# Patient Record
Sex: MALE | Race: WHITE | NOT HISPANIC OR LATINO | Employment: OTHER | URBAN - METROPOLITAN AREA
[De-identification: names, ages, dates, MRNs, and addresses within clinical notes are randomized per-mention and may not be internally consistent; named-entity substitution may affect disease eponyms.]

---

## 2019-07-02 ENCOUNTER — OFFICE VISIT (OUTPATIENT)
Dept: URGENT CARE | Facility: PHYSICIAN GROUP | Age: 59
End: 2019-07-02

## 2019-07-02 VITALS
TEMPERATURE: 98.6 F | BODY MASS INDEX: 28.89 KG/M2 | HEART RATE: 74 BPM | DIASTOLIC BLOOD PRESSURE: 70 MMHG | WEIGHT: 218 LBS | RESPIRATION RATE: 16 BRPM | HEIGHT: 73 IN | SYSTOLIC BLOOD PRESSURE: 128 MMHG | OXYGEN SATURATION: 94 %

## 2019-07-02 DIAGNOSIS — B02.9 HERPES ZOSTER WITHOUT COMPLICATION: ICD-10-CM

## 2019-07-02 PROCEDURE — 99203 OFFICE O/P NEW LOW 30 MIN: CPT | Performed by: PHYSICIAN ASSISTANT

## 2019-07-02 RX ORDER — VALACYCLOVIR HYDROCHLORIDE 1 G/1
1000 TABLET, FILM COATED ORAL 3 TIMES DAILY
Qty: 21 TAB | Refills: 0 | Status: SHIPPED | OUTPATIENT
Start: 2019-07-02 | End: 2019-07-09

## 2019-07-02 ASSESSMENT — ENCOUNTER SYMPTOMS
MUSCULOSKELETAL NEGATIVE: 1
GASTROINTESTINAL NEGATIVE: 1
RESPIRATORY NEGATIVE: 1
CONSTITUTIONAL NEGATIVE: 1
CARDIOVASCULAR NEGATIVE: 1

## 2019-07-02 NOTE — PROGRESS NOTES
"Subjective:      Stephan Chamorro is a 59 y.o. male who presents with Rash (on Lt side, painful  X 5)        Rash     Patient presents for 5 days of worsening left sided trunk and abdomen rash.The rash is not itchy but is painful to touch.  No relieviating factors. Aggravating factors  He tried OTC anti-itch cream but did not help.  He rates his pain is about 3/10   Denies any associated symptoms such fevers, chills, myalgias, N/V/D.        Review of Systems   Constitutional: Negative.    HENT: Negative.    Respiratory: Negative.    Cardiovascular: Negative.    Gastrointestinal: Negative.    Musculoskeletal: Negative.    Skin: Positive for rash. Negative for itching.   Endo/Heme/Allergies: Negative.    All other systems reviewed and are negative.       PMH:  has no past medical history on file.  MEDS:   Current Outpatient Prescriptions:   •  clotrimazole (LOTRIMIN AF) 1 % CREA, Apply  to affected area(s) 2 times a day., Disp: , Rfl:   •  ibuprofen (MOTRIN) 200 MG TABS, Take 200 mg by mouth every 6 hours as needed., Disp: , Rfl:   ALLERGIES: No Known Allergies  SURGHX:   Past Surgical History:   Procedure Laterality Date   • APPENDECTOMY       SOCHX:  reports that he has been smoking Cigarettes.  He has a 15.00 pack-year smoking history. He has never used smokeless tobacco. He reports that he drinks alcohol. He reports that he does not use drugs.  FH: Family history was reviewed, no pertinent findings to report   Objective:     /70   Pulse 74   Temp 37 °C (98.6 °F)   Resp 16   Ht 1.854 m (6' 1\")   Wt 98.9 kg (218 lb)   SpO2 94%   BMI 28.76 kg/m²      Physical Exam   Constitutional: He is oriented to person, place, and time. He appears well-developed and well-nourished. No distress.   HENT:   Head: Normocephalic and atraumatic.   Right Ear: External ear normal.   Left Ear: External ear normal.   Nose: Nose normal.   Eyes: Pupils are equal, round, and reactive to light. Conjunctivae and EOM are normal.   "   Neck: Normal range of motion. Neck supple.   Cardiovascular: Normal rate, regular rhythm and normal heart sounds.    Pulmonary/Chest: Effort normal and breath sounds normal. No respiratory distress.   Musculoskeletal: Normal range of motion. He exhibits no edema.   Neurological: He is alert and oriented to person, place, and time.   Skin: Skin is warm and dry. He is not diaphoretic.        Psychiatric: He has a normal mood and affect. His behavior is normal.   Vitals reviewed.              Assessment/Plan:     1. Herpes zoster without complication  valacyclovir (VALTREX) 1 GM Tab       -consistent with zoster as above.   -detailed discussion and education of this dx with patient.   -valtrex rx.   -PCP follow up encouraged and recommended ot this.       Supportive care, differential diagnoses, and indications for immediate follow-up discussed with patient.   Pathogenesis of diagnosis discussed including typical length and natural progression.   Instructed to return to clinic or nearest emergency department for any change in condition, further concerns, or worsening of symptoms.  Patient states understanding of the plan of care and discharge instructions.      Catrina Ventura P.A.-C.

## 2023-01-01 ENCOUNTER — HOSPITAL ENCOUNTER (OUTPATIENT)
Dept: RADIOLOGY | Facility: MEDICAL CENTER | Age: 63
End: 2023-10-05
Attending: EMERGENCY MEDICINE
Payer: COMMERCIAL

## 2023-01-01 ENCOUNTER — HOSPITAL ENCOUNTER (OUTPATIENT)
Dept: RADIOLOGY | Facility: MEDICAL CENTER | Age: 63
End: 2023-10-05
Attending: FAMILY MEDICINE
Payer: COMMERCIAL

## 2023-01-01 ENCOUNTER — PATIENT OUTREACH (OUTPATIENT)
Dept: ONCOLOGY | Facility: MEDICAL CENTER | Age: 63
End: 2023-01-01
Payer: COMMERCIAL

## 2023-01-01 ENCOUNTER — HOSPITAL ENCOUNTER (OUTPATIENT)
Dept: RADIATION ONCOLOGY | Facility: MEDICAL CENTER | Age: 63
End: 2023-10-17

## 2023-01-01 ENCOUNTER — HOSPITAL ENCOUNTER (OUTPATIENT)
Dept: RADIATION ONCOLOGY | Facility: MEDICAL CENTER | Age: 63
End: 2023-10-31
Attending: RADIOLOGY
Payer: COMMERCIAL

## 2023-01-01 ENCOUNTER — HOSPITAL ENCOUNTER (OUTPATIENT)
Dept: RADIATION ONCOLOGY | Facility: MEDICAL CENTER | Age: 63
End: 2023-10-12
Payer: COMMERCIAL

## 2023-01-01 ENCOUNTER — HOSPITAL ENCOUNTER (OUTPATIENT)
Dept: RADIATION ONCOLOGY | Facility: MEDICAL CENTER | Age: 63
End: 2023-10-06

## 2023-01-01 ENCOUNTER — HOSPITAL ENCOUNTER (OUTPATIENT)
Dept: RADIATION ONCOLOGY | Facility: MEDICAL CENTER | Age: 63
End: 2023-10-13
Payer: COMMERCIAL

## 2023-01-01 ENCOUNTER — HOSPITAL ENCOUNTER (OUTPATIENT)
Dept: RADIATION ONCOLOGY | Facility: MEDICAL CENTER | Age: 63
End: 2023-10-18

## 2023-01-01 ENCOUNTER — HOSPITAL ENCOUNTER (OUTPATIENT)
Dept: RADIOLOGY | Facility: MEDICAL CENTER | Age: 63
End: 2023-10-05
Attending: NURSE PRACTITIONER
Payer: COMMERCIAL

## 2023-01-01 ENCOUNTER — HOSPITAL ENCOUNTER (OUTPATIENT)
Dept: RADIOLOGY | Facility: MEDICAL CENTER | Age: 63
End: 2023-10-05
Attending: STUDENT IN AN ORGANIZED HEALTH CARE EDUCATION/TRAINING PROGRAM
Payer: COMMERCIAL

## 2023-01-01 ENCOUNTER — HOSPITAL ENCOUNTER (OUTPATIENT)
Dept: RADIOLOGY | Facility: MEDICAL CENTER | Age: 63
End: 2023-06-29
Attending: NURSE PRACTITIONER
Payer: COMMERCIAL

## 2023-01-01 ENCOUNTER — HOSPITAL ENCOUNTER (OUTPATIENT)
Dept: RADIATION ONCOLOGY | Facility: MEDICAL CENTER | Age: 63
End: 2023-10-16
Payer: COMMERCIAL

## 2023-01-01 ENCOUNTER — TELEPHONE (OUTPATIENT)
Dept: HEALTH INFORMATION MANAGEMENT | Facility: OTHER | Age: 63
End: 2023-01-01

## 2023-01-01 ENCOUNTER — HOSPITAL ENCOUNTER (OUTPATIENT)
Dept: RADIATION ONCOLOGY | Facility: MEDICAL CENTER | Age: 63
End: 2023-11-30
Attending: RADIOLOGY
Payer: COMMERCIAL

## 2023-01-01 VITALS
TEMPERATURE: 96.2 F | SYSTOLIC BLOOD PRESSURE: 103 MMHG | HEIGHT: 73 IN | BODY MASS INDEX: 25.22 KG/M2 | DIASTOLIC BLOOD PRESSURE: 73 MMHG | OXYGEN SATURATION: 95 % | HEART RATE: 77 BPM | WEIGHT: 190.26 LBS

## 2023-01-01 DIAGNOSIS — C61 MALIGNANT NEOPLASM OF PROSTATE (HCC): ICD-10-CM

## 2023-01-01 LAB
CHEMOTHERAPY INFUSION START DATE: NORMAL
CHEMOTHERAPY INFUSION STOP DATE: NORMAL
CHEMOTHERAPY RECORDS: 2500
CHEMOTHERAPY RECORDS: 5
CHEMOTHERAPY RECORDS: NORMAL
CHEMOTHERAPY RX CANCER: NORMAL
DATE 1ST CHEMO CANCER: NORMAL
RAD ONC ARIA COURSE LAST TREATMENT DATE: NORMAL
RAD ONC ARIA COURSE TREATMENT ELAPSED DAYS: NORMAL
RAD ONC ARIA REFERENCE POINT DOSAGE GIVEN TO DATE: 10
RAD ONC ARIA REFERENCE POINT DOSAGE GIVEN TO DATE: 15
RAD ONC ARIA REFERENCE POINT DOSAGE GIVEN TO DATE: 20
RAD ONC ARIA REFERENCE POINT DOSAGE GIVEN TO DATE: 25
RAD ONC ARIA REFERENCE POINT DOSAGE GIVEN TO DATE: 25
RAD ONC ARIA REFERENCE POINT DOSAGE GIVEN TO DATE: 5
RAD ONC ARIA REFERENCE POINT ID: NORMAL
RAD ONC ARIA REFERENCE POINT SESSION DOSAGE GIVEN: 5

## 2023-01-01 PROCEDURE — 77373 STRTCTC BDY RAD THER TX DLVR: CPT | Performed by: RADIOLOGY

## 2023-01-01 PROCEDURE — 77290 THER RAD SIMULAJ FIELD CPLX: CPT | Performed by: RADIOLOGY

## 2023-01-01 PROCEDURE — 77435 SBRT MANAGEMENT: CPT | Performed by: RADIOLOGY

## 2023-01-01 PROCEDURE — 77280 THER RAD SIMULAJ FIELD SMPL: CPT | Mod: 26 | Performed by: RADIOLOGY

## 2023-01-01 PROCEDURE — 77295 3-D RADIOTHERAPY PLAN: CPT | Mod: 26 | Performed by: RADIOLOGY

## 2023-01-01 PROCEDURE — 77470 SPECIAL RADIATION TREATMENT: CPT | Mod: 26 | Performed by: RADIOLOGY

## 2023-01-01 PROCEDURE — 77280 THER RAD SIMULAJ FIELD SMPL: CPT | Performed by: RADIOLOGY

## 2023-01-01 PROCEDURE — 77334 RADIATION TREATMENT AID(S): CPT | Mod: 26 | Performed by: RADIOLOGY

## 2023-01-01 PROCEDURE — 77300 RADIATION THERAPY DOSE PLAN: CPT | Mod: 26 | Performed by: RADIOLOGY

## 2023-01-01 PROCEDURE — 77334 RADIATION TREATMENT AID(S): CPT | Performed by: RADIOLOGY

## 2023-01-01 PROCEDURE — 99205 OFFICE O/P NEW HI 60 MIN: CPT | Mod: 25 | Performed by: RADIOLOGY

## 2023-01-01 PROCEDURE — 77263 THER RADIOLOGY TX PLNG CPLX: CPT | Performed by: RADIOLOGY

## 2023-01-01 PROCEDURE — 77300 RADIATION THERAPY DOSE PLAN: CPT | Performed by: RADIOLOGY

## 2023-01-01 PROCEDURE — 77470 SPECIAL RADIATION TREATMENT: CPT | Performed by: RADIOLOGY

## 2023-01-01 PROCEDURE — 77336 RADIATION PHYSICS CONSULT: CPT | Mod: XU | Performed by: RADIOLOGY

## 2023-01-01 PROCEDURE — 77290 THER RAD SIMULAJ FIELD CPLX: CPT | Mod: 26 | Performed by: RADIOLOGY

## 2023-01-01 PROCEDURE — A9595 CT-PETCT-PROSTATE SCAN SKULL BASE TO MID-THIGH (PYLARIFY OR AXUMIN): HCPCS

## 2023-01-01 PROCEDURE — 77370 RADIATION PHYSICS CONSULT: CPT | Performed by: RADIOLOGY

## 2023-01-01 PROCEDURE — 77295 3-D RADIOTHERAPY PLAN: CPT | Performed by: RADIOLOGY

## 2023-01-01 PROCEDURE — 99214 OFFICE O/P EST MOD 30 MIN: CPT | Mod: 25 | Performed by: RADIOLOGY

## 2023-01-01 RX ORDER — METHOCARBAMOL 500 MG/1
500 TABLET, FILM COATED ORAL
COMMUNITY

## 2023-01-01 RX ORDER — MULTIVIT WITH MINERALS/LUTEIN
1000 TABLET ORAL
COMMUNITY

## 2023-01-01 RX ORDER — NAPROXEN SODIUM 220 MG
220 TABLET ORAL DAILY
COMMUNITY

## 2023-01-01 RX ORDER — NAPROXEN 500 MG/1
TABLET ORAL
COMMUNITY
Start: 2023-01-01 | End: 2023-01-01

## 2023-01-01 RX ORDER — DEXAMETHASONE 4 MG/1
8 TABLET ORAL
COMMUNITY
Start: 2023-01-01

## 2023-01-01 RX ORDER — FLUDROCORTISONE ACETATE 0.1 MG/1
TABLET ORAL
COMMUNITY
Start: 2023-01-01 | End: 2024-08-08

## 2023-01-01 ASSESSMENT — PAIN SCALES - GENERAL: PAINLEVEL: 2=MINIMAL-SLIGHT

## 2023-10-06 PROBLEM — C61 MALIGNANT NEOPLASM OF PROSTATE (HCC): Status: ACTIVE | Noted: 2023-01-01

## 2023-10-06 NOTE — CONSULTS
RADIATION ONCOLOGY CONSULT    DATE OF SERVICE: 10/6/2023    IDENTIFICATION: A 63 y.o. male with   Visit Diagnoses     ICD-10-CM   1. Malignant neoplasm of prostate (HCC)  C61     Malignant neoplasm of prostate (HCC)  Staging form: Prostate, AJCC 8th Edition  - Clinical stage from 10/6/2023: Stage IVB (cTX, cNX, pM1b, PSA: 50.3, Grade Group: 5) - Signed by Ervin Gant M.D. on 10/6/2023  Prostate specific antigen (PSA) range: 20 or greater  Histologic grading system: 5 grade system    He is here at the kind request of Dr. Ashley Small    HISTORY OF PRESENT ILLNESS:   Patient originally presented in early 2022 with [place] 5+4 prostate cancer by the metastatic bone on presentation.  PSA 50.3 10/7/2021 at diagnosis. He was started on dual deprivation therapy with good response to PSA but still persistent pain.  Patient was trialed on abiraterone since 2021. PSA as low as 3.09 5/16/22 and the slowly ishmael to 15.87 10/24/22 and then enzalutamide 11/2022 with Dr. Veronica Palacio [facility]. Underwent palliative radiation to bilateral femurs 30 Leiva in 10 fractions completed February 4, 2022.  Patient completed lumbar spine radiation 30 Leiva in 10 fractions February 4, 2022.  Patient underwent 30 Gray in 10 fractions to right shoulder April 6, 2022.  Patient underwent left shoulder radiation 8 Leiva in 1 fraction November 8, 2022.  He was switched to docetaxel February 15, 2023.  Underwent 30 Leiva in 10 fractions to T9-T12 completed April 14, 2023. SBRT to L3/L4 completed 4/2023.   PSMA PET scan June 29, 2023 which shows extensive osseous metastatic disease largest lesions in left femur right clavicle T5, L1, right sacrum and bilateral proximal femurs. Patient was recently admitted had left eye proptosis with disconjugate gaze underwent MRI brain which showed infiltrative mass in the clivus extending to the dorsum sella and left cavernous sinus with enhancing soft tissue nodule of the left  cerebellopontine angle.  Mass abuts the left 7th cranial nerve and is in close proximity to the 5th and 8th CN.  Has difficulty with abduction of the eye and has significant ptosis.  Also has double vision.    PAST MEDICAL HISTORY:  Past Medical History:   Diagnosis Date    History of ptosis of eyelid     Left eye    Prostate cancer (HCC) 2021       PAST SURGICAL HISTORY:  Past Surgical History:   Procedure Laterality Date    PROSTATE NEEDLE BIOPSY Right 10/2021    APPENDECTOMY         CURRENT MEDICATIONS:  Current Outpatient Medications   Medication Sig Dispense Refill    Cholecalciferol (VITAMIN D) 400 UNIT Tab       cyanocobalamin (VITAMIN B12) 500 MCG tablet Take 500 mcg by mouth every day.      dexamethasone (DECADRON) 4 MG Tab 8 mg.      fludrocortisone (FLORINEF) 0.1 MG Tab TAKE ONE-HALF TABLET BY MOUTH EVERY MORNING FOR ADRENAL INSUFFICIENCY      methocarbamol (ROBAXIN) 500 MG Tab Take 500 mg by mouth.      naproxen (ANAPROX) 220 MG tablet Take 220 mg by mouth every day.      naproxen (NAPROSYN) 500 MG Tab TAKE ONE TABLET BY MOUTH TWICE A DAY AS NEEDED FOR PAIN AND INFLAMMATION WITH A MEAL **MEDICATION GUIDE**      Ascorbic Acid (VITAMIN C) 1000 MG Tab Take 1,000 mg by mouth.      clotrimazole (LOTRIMIN AF) 1 % CREA Apply  to affected area(s) 2 times a day.      ibuprofen (MOTRIN) 200 MG TABS Take 200 mg by mouth every 6 hours as needed.       No current facility-administered medications for this encounter.       ALLERGIES:    Patient has no known allergies.    FAMILY HISTORY:    No fam hx of cancer    SOCIAL HISTORY:     reports that he has been smoking cigarettes. He started smoking about 32 years ago. He has a 15.0 pack-year smoking history. He has never used smokeless tobacco. He reports that he does not currently use alcohol. He reports that he does not use drugs. He states he lives in [place] with his spouse, Ciera. He is retired from the [employer].     REVIEW OF SYSTEMS:  A review of systems for today's date  "of service was reviewed and uploaded into the electronic medical record.      PHYSICAL EXAM:    ECOG PERFORMANCE STATUS:      10/6/2023     8:18 AM   ECOG Performance Review   ECOG Performance Status Restricted in physically strenuous activity but ambulatory and able to carry out work of a light or sedentary nature, e.g., light house work, office work         10/6/2023     8:18 AM   Karnofsky Score   Karnofsky Score 80     /73 (BP Location: Right arm, Patient Position: Sitting, BP Cuff Size: Adult)   Pulse 77   Temp (!) 35.7 °C (96.2 °F)   Ht 1.854 m (6' 1\")   Wt 86.3 kg (190 lb 4.1 oz)   SpO2 95%   BMI 25.10 kg/m²   Physical Exam  Vitals reviewed.   Eyes:      Pupils: Pupils are equal, round, and reactive to light.   Cardiovascular:      Rate and Rhythm: Normal rate.   Pulmonary:      Effort: Pulmonary effort is normal.   Abdominal:      General: Abdomen is flat.   Neurological:      Mental Status: He is alert.      Cranial Nerves: Cranial nerve deficit present.   Psychiatric:         Mood and Affect: Mood normal.          LABORATORY DATA:   No results found for: \"WBC\", \"HEMOGLOBIN\", \"HEMATOCRIT\", \"MCV\", \"PLATELETCT\", \"NEUTS\"   No results found for: \"SODIUM\", \"POTASSIUM\", \"BUN\", \"CREATININE\", \"CALCIUM\", \"ALBUMIN\", \"ASTSGOT\", \"ALKPHOSPHAT\", \"IFNOTAFR\", \"LDHTOTAL\"    RADIOLOGY DATA:  No results found.    IMPRESSION:    A 63 y.o. with  Visit Diagnoses     ICD-10-CM   1. Malignant neoplasm of prostate (HCC)  C61     Malignant neoplasm of prostate (HCC)  Staging form: Prostate, AJCC 8th Edition  - Clinical stage from 10/6/2023: Stage IVB (cTX, cNX, pM1b, PSA: 50.3, Grade Group: 5) - Signed by Ervin Gant M.D. on 10/6/2023  Prostate specific antigen (PSA) range: 20 or greater  Histologic grading system: 5 grade system        RECOMMENDATIONS:   I discussed case with Dr. 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  I recommend urgent stereotactic radiotherapy to the skull base 25 Leiva in 5 fractions to involve the left cavernous sinus to help stop progression of disease in that area and prevent progression onto the right eye.  I discussed with him that as its been over 10 days since diplopia started this may be permanent but goal of treatment will be to stop him from progressing and stop from involving the right eye.  Patient is amenable to treatment and will undergo radiation mapping today with treatment start next week.  We discussed risk and benefits patient is amenable to treatment.  He is getting a repeat PSA next week but if he is progressing on docetaxel likely will need systemic treatment change and I mentioned that he should follow-up with his medical oncologist Dr. Veronica Palacio and he may be a candidate for [id number] PSMA.    Thank you for the opportunity to participate in his care.  If any questions or comments, please do not hesitate in calling.    Orders Placed This Encounter    Rad Onc Treatment Planning CT Simulation    Referral to Oncology Psychosocial Screening for Distress    Cholecalciferol (VITAMIN D) 400 UNIT Tab    cyanocobalamin (VITAMIN B12) 500 MCG tablet    dexamethasone (DECADRON) 4 MG Tab    fludrocortisone (FLORINEF) 0.1 MG Tab    methocarbamol (ROBAXIN) 500 MG Tab    naproxen (ANAPROX) 220 MG tablet    naproxen (NAPROSYN) 500 MG Tab    Ascorbic Acid (VITAMIN C) 1000 MG Tab       CC: Dr. Veronica Palacio, Ashley Small

## 2023-10-06 NOTE — RADIATION COMPLETION NOTES
PATIENT NAME Stephan Chamorro   PRIMARY PHYSICIAN Pcp Pt States None [phone]   REFERRING PHYSICIAN No ref. provider found 1960     DATE OF SERVICE: 10/6/2023    DIAGNOSIS:  Malignant neoplasm of prostate (HCC)  Staging form: Prostate, AJCC 8th Edition  - Clinical stage from 10/6/2023: Stage IVB (cTX, cNX, pM1b, PSA: 50.3, Grade Group: 5) - Signed by Ervin Gant M.D. on 10/6/2023  Prostate specific antigen (PSA) range: 20 or greater  Histologic grading system: 5 grade system         SPECIAL TREATMENT PROCEDURE NOTE:  Considerable additional effort required in the management of this case because of administration of Stereotactic Radiotherapy, which may result in increased normal tissue toxicity and require greater effort in contouring and treatment because of greater precision.

## 2023-10-06 NOTE — RADIATION COMPLETION NOTES
DATE OF SERVICE: 10/6/2023    DIAGNOSIS:  Malignant neoplasm of prostate (HCC)  Staging form: Prostate, AJCC 8th Edition  - Clinical stage from 10/6/2023: Stage IVB (cTX, cNX, pM1b, PSA: 50.3, Grade Group: 5) - Signed by Ervin Gant M.D. on 10/6/2023  Prostate specific antigen (PSA) range: 20 or greater  Histologic grading system: 5 grade system       DATE OF SERVICE: 10/6/2023    TYPE OF SIMULATION: Brain    GOAL OF TREATMENT:   [] Curative  [x] Palliative  [] Oligometastatic    CONTRAST:    [] IV Contrast*                POSITION:    [x]  Supine  [] Prone     COMPLEX:  [] Complex Blocking   [x]Arcs  [] Custom Blocks  [] >3 Sites    PROCEDURE: Patient placed Stereotactic Mask with head in neutral position. CT scan obtained at 1 mm intervals. Images viewed and approved.  Images reconstructed as need.  Image data sets transferred to Brain-Lab for planning.    I have personally reviewed the relevant data, performed the target localization, and determined all relevant factors for this patient’s simulation.    *Omnipaque 80 -100cc IVP in conjunction with 500cc NS

## 2023-10-06 NOTE — PROGRESS NOTES
"Patient was seen today in clinic with Dr. Gant for consult.  Vitals signs and weight were obtained and pain assessment was completed.  Allergies and medications were reviewed with the patient.       Vitals/Pain:  Vitals:    10/06/23 0808   BP: 103/73   BP Location: Right arm   Patient Position: Sitting   BP Cuff Size: Adult   Pulse: 77   Temp: (!) 35.7 °C (96.2 °F)   SpO2: 95%   Weight: 86.3 kg (190 lb 4.1 oz)   Height: 1.854 m (6' 1\")   Pain Score: 2=Minimal-Slight        Allergies:   Patient has no known allergies.    Current Medications:  Current Outpatient Medications   Medication Sig Dispense Refill    Cholecalciferol (VITAMIN D) 400 UNIT Tab       cyanocobalamin (VITAMIN B12) 500 MCG tablet Take 500 mcg by mouth every day.      dexamethasone (DECADRON) 4 MG Tab 8 mg.      fludrocortisone (FLORINEF) 0.1 MG Tab TAKE ONE-HALF TABLET BY MOUTH EVERY MORNING FOR ADRENAL INSUFFICIENCY      methocarbamol (ROBAXIN) 500 MG Tab Take 500 mg by mouth.      naproxen (ANAPROX) 220 MG tablet Take 220 mg by mouth every day.      naproxen (NAPROSYN) 500 MG Tab TAKE ONE TABLET BY MOUTH TWICE A DAY AS NEEDED FOR PAIN AND INFLAMMATION WITH A MEAL **MEDICATION GUIDE**      Ascorbic Acid (VITAMIN C) 1000 MG Tab Take 1,000 mg by mouth.      clotrimazole (LOTRIMIN AF) 1 % CREA Apply  to affected area(s) 2 times a day.      ibuprofen (MOTRIN) 200 MG TABS Take 200 mg by mouth every 6 hours as needed.       No current facility-administered medications for this encounter.           Manjula Ceja R.N.   "

## 2023-10-06 NOTE — RADIATION COMPLETION NOTES
Clinical Treatment Planning Note    DATE OF SERVICE: 10/6/2023    DIAGNOSIS:  Malignant neoplasm of prostate (HCC)  Staging form: Prostate, AJCC 8th Edition  - Clinical stage from 10/6/2023: Stage IVB (cTX, cNX, pM1b, PSA: 50.3, Grade Group: 5) - Signed by Ervin Gant M.D. on 10/6/2023  Prostate specific antigen (PSA) range: 20 or greater  Histologic grading system: 5 grade system         IMAGING REVIEWED:  [x] CT     [x] MRI     [] PET/CT     [] BONE SCAN     [] MAMMO     [] OTHER      TREATMENT INTENT:   [] CURATIVE     [] MAINTENANCE     [x]  PALLIATIVE      []  SUPPORTIVE     []  PROPHYLACTIC     [] BENIGN     []  CONSOLIDATIVE      [] DEFINITIVE   []  OLOGIMETASTATIC      LINE OF TREATMENT:  [] ADJUVANT   [x] DEFINITIVE   [] NEOADJUVANT   [] RE-TREATMENT      TECHNIQUE PLANNED:  [] IMRT   [] 3D   [x] SBRT   [] SRS/SRT   [] HDR   [] ELECTRON       IMRT JUSTIFICATION:  []   An immediately adjacent area has been previously irradiated and abutting portals must be established with high precision.    []  Dose escalation is planned to deliver radiation doses in excess of those commonly utilized for similar tumors with conventional treatment.    [x]  The target volume is concave or convex, and the critical normal tissues are within or around that convexity or concavity.    []  The target volume is in close proximity to critical structures that must be protected.    []  The volume of interest must be covered with narrow margins to adequately protect  immediately adjacent structures.      FIELDS & BLOCKING:  [x] COMPLEX BLOCKS     []  = 3 TX AREAS     [x]  ARCS     []  CUSTOM SHEILD        []  SIMPLE BLOCK      CHEMOTHERAPY:  []  CONCURRENT     []  INDUCTION     [x] SEQUENTIAL     []  <30 DAYS FROM XRT      NOTES:  OAR CONSTRAINTS: (GUIDELINES ONLY NOT ABSOLUTE) QUANTEC OR AS STATED     One fraction Three fractions Five fractions    Serial Issue Max critical volume above threshold Threshold dose    (Gy) Max point  dose (Gy)^a  Threshold dose   (Gy) Max point dose (Gy)^a Threshold dose   (Gy) Max point dose (Gy)^a End point (greater than or equal to Grade3)   Optic pathway <0.2 cc 8 10 15.3 (5.1 Gy/fx) 17.4 (5.8 Gy/fx) 23 (4.6 Gy/fx) 25 (5 Gy/fx) Neuritis    [place]      9  17.1 (5.7 Gy/fx)  25 (5 Gy/fx) Hearing loss    Brainstem  (non medulla) <0.5 cc 10 15 18 (6 Gy/fx) 23.1 (7.7 Gy/fx) 23 (4.6 Gy/fx)   31 (6.2 Gy/fx) Cranial neuropathy   Spinal chord   and medulla <0.35 cc      <1.2 cc 10      7 14 18 (6 Gy/fx)    12.3 (4.1 Gy/fx) 21.9 (7.3 Gy/fx) 23 (4.6 Gy/fx)    14.5 (2.9 Gy/fx) 30 (6 Gy/fx) Myelitis   Spinal cord subvolume (5-6mm above and below level treated per [place]) <10% of  subvolume 10 14 18 (6 Gy/fx) 21.9 (7.3 Gy/fx) 23 (4.6 Gy/fx) 30 (6 Gy/fx) Myelitis   Cauda equina  <5 cc 14 16 21.9 (7.3 Gy/fx) 24 (8 Gy/fx) 30 (6 Gy/fx) 32 (6.4 Gy/fx) Neuritis   Sacral plexus <5 cc 14.4 16 22.5 (7.5 Gy/fx) 24 (8 (Gy/fx) 30 (6 Gy/fx) 32 (6.4 Gy/fx) Neuropathy   Esophagus^b <5 cc 11.9 15.4 17.7 (5.9 Gy/fx) 25.2 (8.4 Gy/fx) 19.5 (3.9 Gy/fx) 35 (7 Gy/fx) Stenosis/fistula   Brachial plexus <3 cc 14 17.5 20.4 (6.8 Gy/fx 24 (8 Gy/fx) 27 (5.4 Gy/fx) 30.5 (6.1 Gy/fx) Neuropathy   Heart/ pericardium <15 cc 16 22 24 (8 Gy/fx) 30 (10 Gy/fx) 32 (6.4 Gy/fx) 38 (7.6 Gy/fx) Pericarditis   Great vessels <10 cc 31 37 39 (13 Gy/fx) 45 (15 Gy/fx) 47 (9.4 Gy/fx) 53 (10.6 Gy/fx) Aneurysm   Trachea and large bronchus^b <4 cc 10.5 20.2 15 (5 Gy/fx) 30 (10 Gy/fx) 16.5 (3.3 Gy/fx) 40 (8 Gy/fx) Stenosis/ fistula   Bronchus- smaller airways <0.5 cc 12.4 13.3 18.9 (6.3 Gy/fx) 23.1 (7.7 Gy/fx) 21 (4.2 Gy/fx) 33 (6.6 Gy/fx) Stenosis with atelectasis   Rib <1 cc      <30 cc 22 30 28.8 (9.6 Gy/fx)    30.0 (10.0 Gy/fx) 36.9 (12.3 Gy/fx) 35 (7 Gy/fx) 43 (8.6 Gy/fx) Pain or fracture   Skin <10 cc 23 26 30 (10 Gy/fx) 33 (11 Gy/fx) 36.5 (7.3 Gy/fx) 39.5 (7.9 Gy/fx) Ulceration   Stomach <10 cc 11.2 12.4 16.5 (5.5 Gy/fx) 22.2 (7.4 Gy/fx) 18 (3.6 Gy/fx) 32 (6.4 Gy/fx)  Ulceration/fistula   Duodenum^b <5 cc      <10 cc 11.2      9 12.4 16.5 (5.5 Gy/fx)    11.4 (3.8 Gy/fx) 22.2 (7.4 Gy/fx) 18 (3.6 Gy/fx)    12.5 (2.5 Gy/fx) 32 (6.4 Gy/fx) Ulceration   Jejunum/ Ileum^b  <5 cc 11.9 15.4 17.7 (5.9 Gy/fx) 25.2 (8.4 Gy/fx) 19.5 (3.9 Gy/fx) 35 (7 Gy/fx) Enteritis/ obstruction   Colon^b <20 cc 14.3 18.4 24 (8 Gy/fx) 28.2 (9.4 gy/fx) 25 (5 Gy/fx) 38 (7.6 Gy/fx) Colitis/ fistula   Rectum^b <20 cc 14.3 18.4 24 (8 Gy/fx) 28.2 (9.4 gy/fx) 25 (5 Gy/fx) 38 (7.6 Gy/fx) Proctitis/ fistula   Bladder wall <15 cc 11.4 18.4 16.8 (5.6 Gy/fx) 28.2 (9.4 Gy/fx) 18.3 (3.65 Gy/fx) 38 (7.6 Gy/fx) Cystitis/ fistula   Penile bulb <3 cc 14 34 21.9 (7.3 Gy/fx) 42 (14 Gy/fx) 30 (6 Gy/fx) 50 (10 Gy/fx) Impotence   Femoral heads (right and left) <10 cc 14  21.9 (7.3 Gy/fx)  30 (6 Gy/fx)  Necrosis   Renal hilium/ vascular trunk <2/3 volume 1.6 18.6 (6.2 Gy/fx)   23 (4.6 Gy/fx)  Malignant hypertension         One fraction Three fractions Five fractions    Parallel tissue Max critical volume below threshold Threshold dose (Gy) Max point dose (Gy)^a Threshold dose (Gy) Max point dose (Gy)^a Threshold dose (Gy) Max point dose (Gy)^a End point (greater than or equal to Grade 3)   Lung (right and left) 1500 cc 7 NA- Parallel tissue 11.6 (2.96 Gy/fx) NA- Parallel tissue 12.5 (2.5 Gy/fx) NA- Parallel tissue Basic lung function    Lung (right and left) 1000 cc 7.4 NA- Parallel tissue 12.4 (3.1 Gy/fx) NA- Parallel tissue 13.5 (2.7 Gy/fx) NA- Parallel tissue Pneumonitis   Liver 700 cc 9.1 NA- Parallel tissue 19.2 (4.8 Gy/fx) NA- Parallel tissue 21 (4.2 Gy/fx) NA- Parallel tissue Basic liver function   Renal Cortex (right and left) 200 cc 8.4 NA- Parallel tissue 16 (4 Gy/fx) NA- Parallel tissue 17.5 (3.5 Gy/fx) NA- Parallel tissue Basic renal function   ^a “Point” defined as 0.035 cc or less.  ^b Avoid circumferential irradiation.

## 2023-10-06 NOTE — CT SIMULATION
PATIENT NAME Stephan Bonner Doolittle   PRIMARY PHYSICIAN Pcp Pt States None [phone]   REFERRING PHYSICIAN Ashley Small A.P* 1960     Brain metastasis       Treatment Planning CT Simulation        Order Questions       Question Answer Comment    Is this for a new course of treatment? Yes     Is this an Addendum? No     Implanted Device/Pacemaker No     Simulation Status Initial     Planned Start Date 10/12/2023     Treatment Site Brain     Laterality Axial     Treatment Technique SRS     Other Technique(s) SRT     Treatment Pattern/Frequency Daily 5 tx    Concurrent Chemotherapy No     CT Technique 3D     Slice Thickness 1mm     Scan Extent Brain     Contrast  5 Min Delay    Treatment Device(s) SRS Mask     Patient Attire Gown     Patient Position Supine     Patient Orientation Head First     Arm Position Down     Chin Position Neutral     Treatment Machine TB1 - STx     Treatment Image Guidance CBCT     Frequency (CBCT) Daily     Image Guidance Match Bone     Treatment Planning Image Fusion CT/MR     Special Physics Consult Stereotactic      High Dose     Other Orders Special Tx Procedure Weekly Physics Checl

## 2023-10-11 NOTE — PROGRESS NOTES
First call placed to pt to assess for needs. Pt states he is doing good - not great, but good. Pt has positive attitude and even told ONN two jokes during conversation. ONN reviewed with pt date and time for first radiation appt, which is tomorrow. Pt has no other questions at this time. No barriers to care identified. Will continue to follow for navigation needs.

## 2023-10-17 NOTE — PROCEDURES
DATE OF SERVICE: 10/17/2023    DIAGNOSIS:  Malignant neoplasm of prostate (HCC)  Staging form: Prostate, AJCC 8th Edition  - Clinical stage from 10/6/2023: Stage IVB (cTX, cNX, pM1b, PSA: 50.3, Grade Group: 5) - Signed by Ervin Gant M.D. on 10/6/2023  Prostate specific antigen (PSA) range: 20 or greater  Histologic grading system: 5 grade system       TREATMENT:  Radiation Therapy Episodes       Active Episodes       Radiation Therapy: SRS (10/12/2023)                   Radiation Treatments         Plan Last Treated On Elapsed Days Fractions Treated Prescribed Fraction Dose (cGy) Prescribed Total Dose (cGy)    1 Les SRT C1 10/17/2023 5 @ [id number] 4 of 5 500 2,500                  Reference Point Last Treated On Elapsed Days Most Recent Session Dose (cGy) Total Dose (cGy)    1 Les SRT C1 10/17/2023 5 @ [id number] 500 2,000                            STEREOTACTIC PROCEDURE NOTE:    Called by [employer] machine to verify treatment parameters including:  treatment site, treatment dose, and treatment setup prior to stereotactic treatment..    Patient was placed in the treatment position with use of immobilization device and  laser guidance. CBCT images were acquired for target localization.  Images were reviewed in the axial, coronal, and saggital views and shifts were made as necessary to ensure that patient position matched simulation position.      Treatment delivered per  prescription.  The medical physicist was present throughout the set-up, verification and treatment delivery to oversee the procedure and ensure all parameters agreed with the computerized plan.    I have personally reviewed the relevant data, performed the target localization, and determined all relevant factors for this patient’s simulation.

## 2023-10-18 NOTE — RADIATION COMPLETION NOTES
END OF TREATMENT SUMMARY    Patient name:  Stephan Chamorro    Primary Physician:  Pcp Pt States None MRN: [id number]  CSN: [id number]   Referring physician:  Dr. Ashley Small : 1960, 63 y.o.       TREATMENT SUMMARY:        Course First Treatment Date 10/12/2023    Course Last Treatment Date 10/18/2023   Course Elapsed Days 6 @ [id number]   Course Intent Palliative     Radiation Therapy Episodes       Active Episodes       Radiation Therapy: SRS (10/12/2023)                   Radiation Treatments         Plan Last Treated On Elapsed Days Fractions Treated Prescribed Fraction Dose (cGy) Prescribed Total Dose (cGy)    1 Les SRT C1 10/18/2023 6 @ [id number] 5 of 5 500 2,500                  Reference Point Last Treated On Elapsed Days Most Recent Session Dose (cGy) Total Dose (cGy)    1 Les SRT C1 10/18/2023 6 @ [id number] 500 2,500                                     STAGE:   Malignant neoplasm of prostate (HCC)  Staging form: Prostate, AJCC 8th Edition  - Clinical stage from 10/6/2023: Stage IVB (cTX, cNX, pM1b, PSA: 50.3, Grade Group: 5) - Signed by Ervin Gant M.D. on 10/6/2023  Prostate specific antigen (PSA) range: 20 or greater  Histologic grading system: 5 grade system       TREATMENT INDICATION:   curative     CONCURRENT SYSTEMIC TREATMENT:   xeloda     RT COURSE DISCONTINUED EARLY:   No     PATIENT EXPERIENCE:        No data to display                 FOLLOW-UP PLAN:   8 Weeks     COMMENT:          ANATOMIC TARGET SUMMARY    ANATOMIC TARGET MODALITY TECHNIQUE   brain   External beam, photons FSRT            COMMENT:         DIAGRAMS:      DOSE VOLUME HISTOGRAMS:

## 2023-10-25 NOTE — PROGRESS NOTES
Call placed to pt to check in. He has completed his SRS treatments. Pt states the L eye is better. And that the R eye is unaffected. He is hoping the L eye continues to improve. No questions or concerns voiced at this time.

## 2023-11-02 NOTE — PROGRESS NOTES
Called patient who completed SRT 25 Leiva in 5 fractions October 18, 2023 with good response and improvement in vision on the left.  [facility] Dr. Veronica Palacio at [facility] [facility].  I recommend he follow-up with her to see if she is a candidate for [id number].  I have ordered repeat MRI for 2 months with follow up with me in late January.

## 2023-11-03 NOTE — PROGRESS NOTES
"Call placed to pt to follow up after follow up phone call yesterday. Pt states he talked to Dr. Gant and got a good report of the treatment being helpful for his eye. ONN asked if he needed anything or had care coordination needs. He states \"I'm not all set, I have some other issues other than the eye.\" Pt did not elaborate but ONN was sure to relay that he can reach out to her with any of his \"issues\" and would try to assist or provide resources. Pt grateful but declines intervention at this time. Pt states he hs ONN phone number handy. At the end of the call he asked for a pizza. ONN let him know that she would not be ordering him a pizza but she hope he gets one and it has pineapple on it.  "

## 2024-01-22 ENCOUNTER — APPOINTMENT (OUTPATIENT)
Dept: RADIOLOGY | Facility: MEDICAL CENTER | Age: 64
End: 2024-01-22
Attending: RADIOLOGY
Payer: COMMERCIAL